# Patient Record
Sex: MALE | Race: WHITE
[De-identification: names, ages, dates, MRNs, and addresses within clinical notes are randomized per-mention and may not be internally consistent; named-entity substitution may affect disease eponyms.]

---

## 2020-02-28 ENCOUNTER — HOSPITAL ENCOUNTER (INPATIENT)
Dept: HOSPITAL 74 - YASAS | Age: 30
LOS: 4 days | Discharge: HOME | End: 2020-03-03
Attending: ALLERGY & IMMUNOLOGY | Admitting: ALLERGY & IMMUNOLOGY
Payer: COMMERCIAL

## 2020-02-28 VITALS — BODY MASS INDEX: 25 KG/M2

## 2020-02-28 DIAGNOSIS — F19.282: ICD-10-CM

## 2020-02-28 DIAGNOSIS — R76.11: ICD-10-CM

## 2020-02-28 DIAGNOSIS — Z87.820: ICD-10-CM

## 2020-02-28 DIAGNOSIS — F14.20: ICD-10-CM

## 2020-02-28 DIAGNOSIS — F12.20: ICD-10-CM

## 2020-02-28 DIAGNOSIS — F10.230: Primary | ICD-10-CM

## 2020-02-28 DIAGNOSIS — F19.24: ICD-10-CM

## 2020-02-28 DIAGNOSIS — Z72.0: ICD-10-CM

## 2020-02-28 LAB
ALBUMIN SERPL-MCNC: 4.7 G/DL (ref 3.4–5)
ALP SERPL-CCNC: 62 U/L (ref 45–117)
ALT SERPL-CCNC: 29 U/L (ref 13–61)
ANION GAP SERPL CALC-SCNC: 6 MMOL/L (ref 8–16)
AST SERPL-CCNC: 19 U/L (ref 15–37)
BILIRUB SERPL-MCNC: 0.7 MG/DL (ref 0.2–1)
BUN SERPL-MCNC: 12.2 MG/DL (ref 7–18)
CALCIUM SERPL-MCNC: 9.3 MG/DL (ref 8.5–10.1)
CHLORIDE SERPL-SCNC: 102 MMOL/L (ref 98–107)
CO2 SERPL-SCNC: 28 MMOL/L (ref 21–32)
CREAT SERPL-MCNC: 0.9 MG/DL (ref 0.55–1.3)
DEPRECATED RDW RBC AUTO: 14.2 % (ref 11.9–15.9)
GLUCOSE SERPL-MCNC: 77 MG/DL (ref 74–106)
HCT VFR BLD CALC: 41.7 % (ref 35.4–49)
HGB BLD-MCNC: 14 GM/DL (ref 11.7–16.9)
MCH RBC QN AUTO: 30.1 PG (ref 25.7–33.7)
MCHC RBC AUTO-ENTMCNC: 33.6 G/DL (ref 32–35.9)
MCV RBC: 89.5 FL (ref 80–96)
PLATELET # BLD AUTO: 299 K/MM3 (ref 134–434)
PMV BLD: 9.1 FL (ref 7.5–11.1)
POTASSIUM SERPLBLD-SCNC: 3.8 MMOL/L (ref 3.5–5.1)
PROT SERPL-MCNC: 8.1 G/DL (ref 6.4–8.2)
RBC # BLD AUTO: 4.66 M/MM3 (ref 4–5.6)
SODIUM SERPL-SCNC: 137 MMOL/L (ref 136–145)
WBC # BLD AUTO: 9.2 K/MM3 (ref 4–10)

## 2020-02-28 PROCEDURE — HZ2ZZZZ DETOXIFICATION SERVICES FOR SUBSTANCE ABUSE TREATMENT: ICD-10-PCS | Performed by: ALLERGY & IMMUNOLOGY

## 2020-02-28 RX ADMIN — Medication PRN MG: at 23:19

## 2020-02-28 RX ADMIN — Medication SCH MG: at 22:51
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
## 2020-02-28 NOTE — B[facility].RME
Substance Use & Tx History





- Substance Use History


  ** Alcohol


Substance amount: 2 six packs beer


Frequency of use: Daily


Substance route: Oral


Date of Last Use: 20 (4 am)





  ** Nicotine


Substance amount: 1 cigg


Frequency of use: Less than 3 times per week


Substance route: Smoking


Date of Last Use: 20





  ** Cocaine (Crack)


Substance amount: 5 grams


Frequency of use: Daily


Substance route: Inhalation (ex: sniffing or snorting)


Date of Last Use: 20 (4 am)





  ** Cannabis


Substance amount: 10-12 


Frequency of use: Daily


Substance route: Smoking


Date of Last Use: 20





Physical/Psych/Mental Status





- Behavior


General Behavior: Increased activity (restlessness, agitation)


Eye Contact: Normal





- Cooperativeness


Cooperativeness: Reluctant





- Thinking


Thought Processes: Tight, Logical, Goal Directed


Thought content: Future oriented





- Physical Health Problems


Is patient presently having any pain?: No


Does patient presently have any injuries (include location): No


Does patient currently have a fever: No


Is patient pregnant: No





CIWA


Nausea/Vomitin-Mild Nausea/No Vomiting


Muscle Tremors: 1-None Visible, but Felt


Anxiety: 3


Agitation: 3


Paroxysmal Sweats: 1-Minimal Palms Moist


Orientation: 0-Oriented


Tacttile Disturbances: 0-None


Auditory Disturbances: 0-None


Visual Disturbances: 1-Very Mild Sensitivity


Headache: 0-None Present (drank earlier today at 4am)


CIWA-Ar Total Score: 10
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
## 2020-02-29 LAB
RPR: (no result)
TREPONEMA ANTIBODY: REACTIVE

## 2020-02-29 RX ADMIN — METHOCARBAMOL PRN MG: 500 TABLET ORAL at 18:04

## 2020-02-29 RX ADMIN — Medication SCH TAB: at 10:26
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
## 2020-03-01 RX ADMIN — Medication SCH: at 00:05

## 2020-03-01 RX ADMIN — IBUPROFEN PRN MG: 400 TABLET, FILM COATED ORAL at 21:02

## 2020-03-01 RX ADMIN — Medication SCH MG: at 22:30

## 2020-03-01 RX ADMIN — METHOCARBAMOL PRN MG: 500 TABLET ORAL at 18:43

## 2020-03-01 RX ADMIN — METHOCARBAMOL PRN MG: 500 TABLET ORAL at 10:11

## 2020-03-01 RX ADMIN — Medication SCH TAB: at 10:09

## 2020-03-01 RX ADMIN — Medication PRN MG: at 22:30

## 2020-03-01 NOTE — PN
S CIWA





- CIWA Score


Nausea/Vomitin-Mild Nausea/No Vomiting


Muscle Tremors: 2


Anxiety: 2


Agitation: 2


Paroxysmal Sweats: 2


Orientation: 0-Oriented


Tacttile Disturbances: 0-None


Auditory Disturbances: 0-None


Visual Disturbances: 0-None


Headache: 0-None Present


CIWA-Ar Total Score: 9





[facility] Progress Note (SOAP)


Subjective: 





Feels ok


Objective: 





20 14:16


 Last Vital Signs











Temp Pulse Resp BP Pulse Ox


 


 97.6 F   58 L  18   102/74    


 


 20 08:36  20 08:36  20 08:36  20 08:36   








 Laboratory Tests











  20





  12:30 12:30 12:30


 


WBC   9.2 


 


RBC   4.66 


 


Hgb   14.0 


 


Hct   41.7 


 


MCV   89.5 


 


MCH   30.1 


 


MCHC   33.6 


 


RDW   14.2 


 


Plt Count   299 


 


MPV   9.1 


 


Sodium    137


 


Potassium    3.8


 


Chloride    102


 


Carbon Dioxide    28


 


Anion Gap    6 L


 


BUN    12.2


 


Creatinine    0.9


 


Est GFR (CKD-EPI)AfAm    134.24


 


Est GFR (CKD-EPI)NonAf    115.82


 


Random Glucose    77


 


Calcium    9.3


 


Total Bilirubin    0.7


 


AST    19


 


ALT    29


 


Alkaline Phosphatase    62


 


Total Protein    8.1


 


Albumin    4.7


 


RPR Titer   


 


T.pallidum Ab (MHA)   


 


HIV 1&2 Antibody Screen  Negative  


 


HIV P24 Antigen  Negative  














  20





  12:30


 


WBC 


 


RBC 


 


Hgb 


 


Hct 


 


MCV 


 


MCH 


 


MCHC 


 


RDW 


 


Plt Count 


 


MPV 


 


Sodium 


 


Potassium 


 


Chloride 


 


Carbon Dioxide 


 


Anion Gap 


 


BUN 


 


Creatinine 


 


Est GFR (CKD-EPI)AfAm 


 


Est GFR (CKD-EPI)NonAf 


 


Random Glucose 


 


Calcium 


 


Total Bilirubin 


 


AST 


 


ALT 


 


Alkaline Phosphatase 


 


Total Protein 


 


Albumin 


 


RPR Titer  Reactive 1:2 H


 


T.pallidum Ab (MHA)  Reactive


 


HIV 1&2 Antibody Screen 


 


HIV P24 Antigen 








Labs reviewed


Assessment: 





20 14:17


Withdrawal sxs


Plan: 





Continue detox


Encouraged PO water intake

## 2020-03-01 NOTE — EKG
Test Reason : 

Blood Pressure : ***/*** mmHG

Vent. Rate : 076 BPM     Atrial Rate : 076 BPM

   P-R Int : 134 ms          QRS Dur : 092 ms

    QT Int : 376 ms       P-R-T Axes : 055 056 038 degrees

   QTc Int : 423 ms

 

NORMAL SINUS RHYTHM WITH SINUS ARRHYTHMIA

MODERATE VOLTAGE CRITERIA FOR LVH, MAY BE NORMAL VARIANT

NO PREVIOUS ECGS AVAILABLE

Confirmed by ISIS PEREZ MD ([id number]) on 3/1/2020 11:58:57 AM

 

Referred By:             Confirmed By:ISIS PEREZ MD

## 2020-03-02 RX ADMIN — METHOCARBAMOL PRN MG: 500 TABLET ORAL at 23:08

## 2020-03-02 RX ADMIN — IBUPROFEN PRN MG: 400 TABLET, FILM COATED ORAL at 20:00

## 2020-03-02 RX ADMIN — Medication SCH MG: at 22:12

## 2020-03-02 RX ADMIN — METHOCARBAMOL PRN MG: 500 TABLET ORAL at 16:50

## 2020-03-02 RX ADMIN — METHOCARBAMOL PRN MG: 500 TABLET ORAL at 10:22

## 2020-03-02 RX ADMIN — Medication SCH TAB: at 10:21

## 2020-03-02 RX ADMIN — Medication PRN MG: at 22:12

## 2020-03-02 NOTE — PN
S CIWA





- CIWA Score


Nausea/Vomitin-Mild Nausea/No Vomiting


Muscle Tremors: 1-None Visible, but Felt


Anxiety: 2


Agitation: 2


Paroxysmal Sweats: No Perspiration


Orientation: 0-Oriented


Tacttile Disturbances: 1-Very Mild Itch/Numbness


Auditory Disturbances: 0-None


Visual Disturbances: 0-None


Headache: 1-Very Mild


CIWA-Ar Total Score: 8





[facility] Progress Note (SOAP)


Subjective: 





alert,irritable,anxious,interrupted sleep,aching pain


Objective: 





20 09:56


 Vital Signs











Temperature  97.2 F L  20 05:35


 


Pulse Rate  68   20 05:35


 


Respiratory Rate  18   20 05:35


 


Blood Pressure  99/61   20 05:35


 


O2 Sat by Pulse Oximetry (%)      











Assessment: 





20 09:56


withdrawal symptom


Plan: 





continue detox librium regimen

## 2020-03-03 VITALS — DIASTOLIC BLOOD PRESSURE: 65 MMHG | TEMPERATURE: 97.7 F | HEART RATE: 63 BPM | SYSTOLIC BLOOD PRESSURE: 109 MMHG

## 2020-03-03 RX ADMIN — IBUPROFEN PRN MG: 400 TABLET, FILM COATED ORAL at 09:50

## 2020-03-03 RX ADMIN — METHOCARBAMOL PRN MG: 500 TABLET ORAL at 05:48

## 2020-03-03 RX ADMIN — Medication SCH TAB: at 09:51

## 2020-03-03 NOTE — PN
[facility] CIWA





- CIWA Score


Nausea/Vomitin-No Nausea/No Vomiting


Muscle Tremors: 1-None Visible, but Felt


Anxiety: 0-No Anxiety, at Ease


Agitation: 0-Normal Activity


Paroxysmal Sweats: No Perspiration


Orientation: 0-Oriented


Tacttile Disturbances: 0-None


Auditory Disturbances: 0-None


Visual Disturbances: 0-None


Headache: 0-None Present


CIWA-Ar Total Score: 1





[facility] Progress Note (SOAP)


Subjective: 





alert,no complaint


Objective: 





20 08:58


 Vital Signs











Temperature  97.7 F   20 05:44


 


Pulse Rate  63   20 05:44


 


Respiratory Rate  16   20 06:53


 


Blood Pressure  109/65   20 05:44


 


O2 Sat by Pulse Oximetry (%)      











Assessment: 





20 08:58


no withdrawal symptom


Plan: 





discharge today,follow up with after care program as arrangement

## 2020-03-03 NOTE — DS
[facility] Detox Discharge Summary


Admission Date: 


02/28/20





Discharge Date: 03/03/20





- History


Present History: Alcohol Dependence, Cannabis Dependence, Cocaine Dependence


Additional Comments: 





alert,oriented x 3


ambulation on the unit


heart normal heart sound,s1s2


lung clear bilaterally


no abdominal pain


stable for discharge


has history of syphilis in 2012 seen and treated by PMD received penicillin 

injection


received bicillin 2.4 million unit on 02/29/2020


he will go to see his PMD upon discharge for evaluation and follow up treatment,

patient understood


advise precaution before sexual relation


time spending on discharge 35 mins


follw up with outpatient program as arrangement

















Pertinent Past History: 





traumatic brain injury





- Physical Exam Results


Vital Signs: 


 Vital Signs











Temperature  97.7 F   03/03/20 05:44


 


Pulse Rate  63   03/03/20 05:44


 


Respiratory Rate  16   03/03/20 06:53


 


Blood Pressure  109/65   03/03/20 05:44


 


O2 Sat by Pulse Oximetry (%)      











Pertinent Admission Physical Exam Findings: 





withdrawal signs and symptom


 Laboratory Last Values











WBC  9.2 K/mm3 (4.0-10.0)   02/28/20  12:30    


 


RBC  4.66 M/mm3 (4.00-5.60)   02/28/20  12:30    


 


Hgb  14.0 GM/dL (11.7-16.9)   02/28/20  12:30    


 


Hct  41.7 % (35.4-49)   02/28/20  12:30    


 


MCV  89.5 fl (80-96)   02/28/20  12:30    


 


MCH  30.1 pg (25.7-33.7)   02/28/20  12:30    


 


MCHC  33.6 g/dl (32.0-35.9)   02/28/20  12:30    


 


RDW  14.2 % (11.9-15.9)   02/28/20  12:30    


 


Plt Count  299 K/MM3 (134-434)   02/28/20  12:30    


 


MPV  9.1 fl (7.5-11.1)   02/28/20  12:30    


 


Sodium  137 mmol/L (136-145)   02/28/20  12:30    


 


Potassium  3.8 mmol/L (3.5-5.1)   02/28/20  12:30    


 


Chloride  102 mmol/L ()   02/28/20  12:30    


 


Carbon Dioxide  28 mmol/L (21-32)   02/28/20  12:30    


 


Anion Gap  6 MMOL/L (8-16)  L  02/28/20  12:30    


 


BUN  12.2 mg/dL (7-18)   02/28/20  12:30    


 


Creatinine  0.9 mg/dL (0.55-1.3)   02/28/20  12:30    


 


Est GFR (CKD-EPI)AfAm  134.24   02/28/20  12:30    


 


Est GFR (CKD-EPI)NonAf  115.82   02/28/20  12:30    


 


Random Glucose  77 mg/dL ()   02/28/20  12:30    


 


Calcium  9.3 mg/dL (8.5-10.1)   02/28/20  12:30    


 


Total Bilirubin  0.7 mg/dL (0.2-1)   02/28/20  12:30    


 


AST  19 U/L (15-37)   02/28/20  12:30    


 


ALT  29 U/L (13-61)   02/28/20  12:30    


 


Alkaline Phosphatase  62 U/L ()   02/28/20  12:30    


 


Total Protein  8.1 g/dl (6.4-8.2)   02/28/20  12:30    


 


Albumin  4.7 g/dl (3.4-5.0)   02/28/20  12:30    


 


RPR Titer  Reactive 1:2  (NONREACTIVE)  H  02/28/20  12:30    


 


T.pallidum Ab (MHA)  Reactive  (NONREACTIVE)   02/28/20  12:30    


 


HIV 1&2 Antibody Screen  Negative   02/28/20  12:30    


 


HIV P24 Antigen  Negative   02/28/20  12:30    














- Treatment


Hospital Course: Detox Protocol Followed, Detoxed Safely, Responded well, 

Discharged Condition Good


Patient has Accepted a Rehab Referral to: declined





- Medication


Discharge Medications: 


Ambulatory Orders





NK [No Known Home Medication]  02/28/20 











- Diagnosis


(1) Cannabis dependence


Current Visit: Yes   Status: Acute   





(2) Cocaine dependence


Current Visit: Yes   Status: Acute   





(3) Positive RPR test


Current Visit: Yes   Status: Acute   





(4) Uncomplicated alcohol dependence


Current Visit: Yes   Status: Acute   





(5) TBI (traumatic brain injury)


Current Visit: Yes   Status: Resolved   





- AMA


Did Patient Leave Against Medical Advice: No